# Patient Record
Sex: MALE | Race: BLACK OR AFRICAN AMERICAN | Employment: UNEMPLOYED | URBAN - METROPOLITAN AREA
[De-identification: names, ages, dates, MRNs, and addresses within clinical notes are randomized per-mention and may not be internally consistent; named-entity substitution may affect disease eponyms.]

---

## 2022-09-21 ENCOUNTER — HOSPITAL ENCOUNTER (EMERGENCY)
Age: 27
Discharge: HOME | End: 2022-09-21
Attending: EMERGENCY MEDICINE

## 2022-09-21 VITALS
TEMPERATURE: 98.9 F | WEIGHT: 208 LBS | DIASTOLIC BLOOD PRESSURE: 89 MMHG | HEIGHT: 74 IN | BODY MASS INDEX: 26.69 KG/M2 | SYSTOLIC BLOOD PRESSURE: 138 MMHG | HEART RATE: 71 BPM | OXYGEN SATURATION: 98 % | RESPIRATION RATE: 21 BRPM

## 2022-09-21 DIAGNOSIS — S86.012A RUPTURE OF LEFT ACHILLES TENDON, INITIAL ENCOUNTER: Primary | ICD-10-CM

## 2022-09-21 PROCEDURE — 6370000000 HC RX 637 (ALT 250 FOR IP): Performed by: STUDENT IN AN ORGANIZED HEALTH CARE EDUCATION/TRAINING PROGRAM

## 2022-09-21 PROCEDURE — 99283 EMERGENCY DEPT VISIT LOW MDM: CPT

## 2022-09-21 PROCEDURE — 29515 APPLICATION SHORT LEG SPLINT: CPT

## 2022-09-21 RX ORDER — IBUPROFEN 400 MG/1
400 TABLET ORAL
Status: COMPLETED | OUTPATIENT
Start: 2022-09-21 | End: 2022-09-21

## 2022-09-21 RX ORDER — NAPROXEN 500 MG/1
500 TABLET ORAL 2 TIMES DAILY
Qty: 60 TABLET | Refills: 0 | Status: SHIPPED | OUTPATIENT
Start: 2022-09-21 | End: 2022-10-21

## 2022-09-21 RX ADMIN — IBUPROFEN 400 MG: 400 TABLET, FILM COATED ORAL at 20:22

## 2022-09-21 ASSESSMENT — ENCOUNTER SYMPTOMS
RHINORRHEA: 0
EYE PAIN: 0
SORE THROAT: 0
CHEST TIGHTNESS: 0
SHORTNESS OF BREATH: 0
APNEA: 0
ABDOMINAL PAIN: 0
BLOOD IN STOOL: 0
PHOTOPHOBIA: 0
VOMITING: 0
COUGH: 0
CONSTIPATION: 0
WHEEZING: 0
EYE REDNESS: 0
COLOR CHANGE: 0
DIARRHEA: 0
NAUSEA: 0
EYE DISCHARGE: 0
VOICE CHANGE: 0
SINUS PRESSURE: 0

## 2022-09-21 ASSESSMENT — PAIN SCALES - GENERAL
PAINLEVEL_OUTOF10: 10
PAINLEVEL_OUTOF10: 10

## 2022-09-21 ASSESSMENT — PAIN DESCRIPTION - LOCATION
LOCATION: ANKLE;LEG
LOCATION: ANKLE

## 2022-09-21 ASSESSMENT — PAIN DESCRIPTION - ORIENTATION
ORIENTATION: LEFT
ORIENTATION: LEFT

## 2022-09-21 ASSESSMENT — PAIN - FUNCTIONAL ASSESSMENT: PAIN_FUNCTIONAL_ASSESSMENT: 0-10

## 2022-09-21 ASSESSMENT — PAIN DESCRIPTION - FREQUENCY: FREQUENCY: CONTINUOUS

## 2022-09-21 ASSESSMENT — LIFESTYLE VARIABLES
HOW OFTEN DO YOU HAVE A DRINK CONTAINING ALCOHOL: NEVER
HOW MANY STANDARD DRINKS CONTAINING ALCOHOL DO YOU HAVE ON A TYPICAL DAY: PATIENT DOES NOT DRINK

## 2022-09-21 NOTE — ED TRIAGE NOTES
Pt states he was playing basketball when he heard something pop in his left ankle. Pt states he ruptured his right achilles before and this was a similar feeling.

## 2022-09-22 NOTE — ED PROVIDER NOTES
States he feels like the pain is similar to when he tore his Achilles on his right side. He denies rolling his ankle. Denies paresthesias, swollen compartments. Denies head injury. Denies all other symptoms today. Patient is primary story and quality is reliable. The history is provided by the patient. No  was used. Review of Systems   Constitutional:  Negative for appetite change, chills, fatigue, fever and unexpected weight change. HENT:  Negative for congestion, ear pain, hearing loss, postnasal drip, rhinorrhea, sinus pressure, sore throat and voice change. Eyes:  Negative for photophobia, pain, discharge, redness and visual disturbance. Respiratory:  Negative for apnea, cough, chest tightness, shortness of breath and wheezing. Cardiovascular:  Negative for chest pain, palpitations and leg swelling. Gastrointestinal:  Negative for abdominal pain, blood in stool, constipation, diarrhea, nausea and vomiting. Endocrine: Negative for polydipsia, polyphagia and polyuria. Genitourinary:  Negative for decreased urine volume, dysuria, flank pain, frequency and hematuria. Musculoskeletal:  Positive for gait problem and myalgias. Negative for arthralgias, joint swelling and neck stiffness. Skin:  Negative for color change, rash and wound. Neurological:  Negative for dizziness, syncope, speech difficulty, weakness, light-headedness and headaches. Hematological:  Negative for adenopathy. Does not bruise/bleed easily. Psychiatric/Behavioral:  Negative for confusion, self-injury, sleep disturbance and suicidal ideas. All other systems reviewed and are negative. History reviewed. No pertinent past medical history. Past Surgical History:   Procedure Laterality Date    ACHILLES TENDON SURGERY          History reviewed. No pertinent family history.      Social History     Socioeconomic History    Marital status: Single     Spouse name: None    Number of children: None    Years of education: None    Highest education level: None   Tobacco Use    Smoking status: Never     Passive exposure: Never    Smokeless tobacco: Never   Substance and Sexual Activity    Alcohol use: Never         Patient has no known allergies. Discharge Medication List as of 9/21/2022  8:32 PM           Vitals signs and nursing note reviewed. Patient Vitals for the past 4 hrs:   Temp Pulse Resp BP SpO2   09/21/22 2030 -- 71 21 138/89 98 %   09/21/22 1942 98.9 °F (37.2 °C) 84 16 (!) 129/92 99 %          Physical Exam  Vitals and nursing note reviewed. Constitutional:       General: He is not in acute distress. Appearance: Normal appearance. He is not ill-appearing, toxic-appearing or diaphoretic. HENT:      Head: Normocephalic and atraumatic. Right Ear: Tympanic membrane, ear canal and external ear normal.      Left Ear: Tympanic membrane, ear canal and external ear normal.      Nose: Nose normal.      Mouth/Throat:      Mouth: Mucous membranes are moist.   Eyes:      General: No scleral icterus. Right eye: No discharge. Left eye: No discharge. Conjunctiva/sclera: Conjunctivae normal.   Cardiovascular:      Rate and Rhythm: Normal rate and regular rhythm. Pulses: Normal pulses. Heart sounds: Normal heart sounds. Pulmonary:      Effort: Pulmonary effort is normal. No respiratory distress. Breath sounds: Normal breath sounds. No stridor. No wheezing, rhonchi or rales. Abdominal:      General: Abdomen is flat. Bowel sounds are normal.      Palpations: Abdomen is soft. Tenderness: There is no abdominal tenderness. There is no guarding or rebound. Musculoskeletal:         General: Normal range of motion. Cervical back: Normal range of motion and neck supple. No rigidity or tenderness. Right knee: Normal.      Left knee: Normal.      Right lower leg: Normal. No edema. Left lower leg: Normal. No edema.       Right ankle: Normal.      Left ankle:      Left Achilles Tendon: Tenderness and defect present. Jose's test positive. Right foot: Normal.      Left foot: Normal.        Legs:       Comments: Jose test positive on left. Palpable defect on left. Consistent with Achilles rupture. No signs of compartment syndrome. Neurovascular intact distal to injury. Good capillary refill. Patient unable to bear weight due to pain. Lymphadenopathy:      Cervical: No cervical adenopathy. Skin:     General: Skin is warm and dry. Capillary Refill: Capillary refill takes less than 2 seconds. Coloration: Skin is not jaundiced. Neurological:      General: No focal deficit present. Mental Status: He is alert and oriented to person, place, and time. Mental status is at baseline. Psychiatric:         Mood and Affect: Mood normal.         Behavior: Behavior normal.         Thought Content:  Thought content normal.         Judgment: Judgment normal.        Splint Application    Date/Time: 9/21/2022 10:00 PM  Performed by: ALIZE Cook  Authorized by: José Antonio Alvarez MD     Consent:     Consent obtained:  Verbal    Consent given by:  Patient    Risks, benefits, and alternatives were discussed: yes      Risks discussed:  Discoloration, numbness, swelling and pain  Universal protocol:     Procedure explained and questions answered to patient or proxy's satisfaction: yes      Relevant documents present and verified: yes      Test results available: yes      Imaging studies available: yes      Required blood products, implants, devices, and special equipment available: yes      Site/side marked: yes      Immediately prior to procedure a time out was called: yes      Patient identity confirmed:  Verbally with patient and arm band  Pre-procedure details:     Distal neurologic exam:  Weakness    Distal perfusion: distal pulses strong and brisk capillary refill    Procedure details:     Location:  Leg    Leg location:  L lower leg    Strapping: no      Cast type:  Short leg    Supplies:  Elastic bandage, fiberglass and cotton padding    Attestation: Splint applied and adjusted personally by me    Post-procedure details:     Distal neurologic exam:  Weakness    Distal perfusion: distal pulses strong and brisk capillary refill      Procedure completion:  Tolerated well, no immediate complications  Comments:      Splinted in slight plantar flexion. No results found for any visits on 09/21/22. No orders to display                       Voice dictation software was used during the making of this note. This software is not perfect and grammatical and other typographical errors may be present. This note has not been completely proofread for errors.      Marian Owensma  09/21/22 [id number]

## 2022-09-22 NOTE — ED NOTES
I have reviewed discharge instructions with the patient. The patient verbalized understanding. Patient left ED via Discharge Method: ambulatory to Home with self. Opportunity for questions and clarification provided. Patient given 1 scripts. To continue your aftercare when you leave the hospital, you may receive an automated call from our care team to check in on how you are doing. This is a free service and part of our promise to provide the best care and service to meet your aftercare needs.  If you have questions, or wish to unsubscribe from this service please call [phone]. Thank you for Choosing our [address] [place] [address] Emergency Department.         Ashwin Mendosa RN  09/21/22 4316

## 2022-09-26 ENCOUNTER — ANESTHESIA EVENT (OUTPATIENT)
Dept: SURGERY | Age: 27
End: 2022-09-26

## 2022-09-26 ENCOUNTER — OFFICE VISIT (OUTPATIENT)
Dept: ORTHOPEDIC SURGERY | Age: 27
End: 2022-09-26

## 2022-09-26 DIAGNOSIS — M25.572 ACUTE LEFT ANKLE PAIN: Primary | ICD-10-CM

## 2022-09-26 DIAGNOSIS — S86.012A RUPTURE OF LEFT ACHILLES TENDON, INITIAL ENCOUNTER: Primary | ICD-10-CM

## 2022-09-26 DIAGNOSIS — S86.012A RUPTURE OF LEFT ACHILLES TENDON, INITIAL ENCOUNTER: ICD-10-CM

## 2022-09-26 PROCEDURE — L4360 PNEUMAT WALKING BOOT PRE CST: HCPCS | Performed by: ORTHOPAEDIC SURGERY

## 2022-09-26 PROCEDURE — 99205 OFFICE O/P NEW HI 60 MIN: CPT | Performed by: ORTHOPAEDIC SURGERY

## 2022-09-26 NOTE — PROGRESS NOTES
The patient was prescribed a walker boot for the patient's left foot. The patient wears a size 13 shoe and I fitted them with a L size boot. The patient was fitted and instructed on the use of prescribed walker boot. I explained how to fit themselves and that the plastic flexible piece should always be on the front of the boot and secured by the Velcro straps on top. The air bladder in the boot was adjusted according to proper fit and comfort. The patient walked a short distance and acknowledged satisfaction with current fit. I also explained that they need a heel lift or a higher heeled shoe for the uninvolved LE to help normalize gait and avoid excessive low back stress/strain due to leg length inequality created from walker boot. Patient read and signed documenting they understand and agree to [facility]'s current DME return policy.

## 2022-09-26 NOTE — PROGRESS NOTES
Patient verified name and . Order for consent NOT found in EHR; patient verifies procedure. Type 1B surgery, phone assessment complete. Orders NOT received. Labs per surgeon: NONE  Labs per anesthesia protocol: NONE    Patient answered medical/surgical history questions at their best of ability. All prior to admission medications documented in [facility]. Patient instructed to take the following medications the day of surgery according to anesthesia guidelines with a small sip of water: NONE. Hold all vitamins 7 days prior to surgery and NSAIDS 5 days prior to surgery. Prescription meds to hold: Naproxen  Patient instructed on the following:    > Arrive at [facility], time of arrival to be called the day before by 1700  > NPO after midnight, unless otherwise indicated, including gum, mints, and ice chips  > Responsible adult must drive patient to the hospital, stay during surgery, and patient will need supervision 24 hours after anesthesia  > Use antibacterial soap in shower the night before surgery and on the morning of surgery  > All piercings must be removed prior to arrival.    > Leave all valuables (money and jewelry) at home but bring insurance card and ID on DOS.   > You may be required to pay a deductible or co-pay on the day of your procedure. You can pre-pay by calling [phone] [facility] or [phone] if your surgery is at the [facility]. > Do not wear make-up, nail polish, lotions, cologne, perfumes, powders, or oil on skin. Artificial nails are not permitted.

## 2022-09-27 ENCOUNTER — ANESTHESIA (OUTPATIENT)
Dept: SURGERY | Age: 27
End: 2022-09-27

## 2022-09-27 ENCOUNTER — HOSPITAL ENCOUNTER (OUTPATIENT)
Age: 27
Setting detail: OUTPATIENT SURGERY
Discharge: HOME | End: 2022-09-27
Attending: ORTHOPAEDIC SURGERY | Admitting: ORTHOPAEDIC SURGERY

## 2022-09-27 VITALS
WEIGHT: 208 LBS | SYSTOLIC BLOOD PRESSURE: 141 MMHG | OXYGEN SATURATION: 100 % | BODY MASS INDEX: 25.86 KG/M2 | RESPIRATION RATE: 16 BRPM | HEIGHT: 75 IN | TEMPERATURE: 97.1 F | HEART RATE: 53 BPM | DIASTOLIC BLOOD PRESSURE: 80 MMHG

## 2022-09-27 DIAGNOSIS — G89.18 ACUTE POST-OPERATIVE PAIN: Primary | ICD-10-CM

## 2022-09-27 PROCEDURE — 3600000014 HC SURGERY LEVEL 4 ADDTL 15MIN: Performed by: ORTHOPAEDIC SURGERY

## 2022-09-27 PROCEDURE — 3700000000 HC ANESTHESIA ATTENDED CARE: Performed by: ORTHOPAEDIC SURGERY

## 2022-09-27 PROCEDURE — 6360000002 HC RX W HCPCS

## 2022-09-27 PROCEDURE — 6360000002 HC RX W HCPCS: Performed by: ANESTHESIOLOGY

## 2022-09-27 PROCEDURE — 7100000000 HC PACU RECOVERY - FIRST 15 MIN: Performed by: ORTHOPAEDIC SURGERY

## 2022-09-27 PROCEDURE — 3700000001 HC ADD 15 MINUTES (ANESTHESIA): Performed by: ORTHOPAEDIC SURGERY

## 2022-09-27 PROCEDURE — 2709999900 HC NON-CHARGEABLE SUPPLY: Performed by: ORTHOPAEDIC SURGERY

## 2022-09-27 PROCEDURE — 7100000001 HC PACU RECOVERY - ADDTL 15 MIN: Performed by: ORTHOPAEDIC SURGERY

## 2022-09-27 PROCEDURE — 64447 NJX AA&/STRD FEMORAL NRV IMG: CPT | Performed by: ANESTHESIOLOGY

## 2022-09-27 PROCEDURE — 2500000003 HC RX 250 WO HCPCS

## 2022-09-27 PROCEDURE — 2580000003 HC RX 258: Performed by: NURSE PRACTITIONER

## 2022-09-27 PROCEDURE — 3600000004 HC SURGERY LEVEL 4 BASE: Performed by: ORTHOPAEDIC SURGERY

## 2022-09-27 PROCEDURE — 6360000002 HC RX W HCPCS: Performed by: STUDENT IN AN ORGANIZED HEALTH CARE EDUCATION/TRAINING PROGRAM

## 2022-09-27 PROCEDURE — 7100000011 HC PHASE II RECOVERY - ADDTL 15 MIN: Performed by: ORTHOPAEDIC SURGERY

## 2022-09-27 PROCEDURE — 27650 REPAIR ACHILLES TENDON: CPT | Performed by: ORTHOPAEDIC SURGERY

## 2022-09-27 PROCEDURE — 7100000010 HC PHASE II RECOVERY - FIRST 15 MIN: Performed by: ORTHOPAEDIC SURGERY

## 2022-09-27 PROCEDURE — 64445 NJX AA&/STRD SCIATIC NRV IMG: CPT | Performed by: ANESTHESIOLOGY

## 2022-09-27 PROCEDURE — 6360000002 HC RX W HCPCS: Performed by: NURSE PRACTITIONER

## 2022-09-27 PROCEDURE — 2580000003 HC RX 258

## 2022-09-27 RX ORDER — ROCURONIUM BROMIDE 10 MG/ML
INJECTION, SOLUTION INTRAVENOUS PRN
Status: DISCONTINUED | OUTPATIENT
Start: 2022-09-27 | End: 2022-09-27 | Stop reason: SDUPTHER

## 2022-09-27 RX ORDER — IPRATROPIUM BROMIDE AND ALBUTEROL SULFATE 2.5; .5 MG/3ML; MG/3ML
1 SOLUTION RESPIRATORY (INHALATION)
Status: DISCONTINUED | OUTPATIENT
Start: 2022-09-27 | End: 2022-09-27 | Stop reason: HOSPADM

## 2022-09-27 RX ORDER — LIDOCAINE HYDROCHLORIDE 10 MG/ML
1 INJECTION, SOLUTION INFILTRATION; PERINEURAL
Status: DISCONTINUED | OUTPATIENT
Start: 2022-09-27 | End: 2022-09-27 | Stop reason: HOSPADM

## 2022-09-27 RX ORDER — ONDANSETRON 2 MG/ML
INJECTION INTRAMUSCULAR; INTRAVENOUS PRN
Status: DISCONTINUED | OUTPATIENT
Start: 2022-09-27 | End: 2022-09-27 | Stop reason: SDUPTHER

## 2022-09-27 RX ORDER — SODIUM CHLORIDE 0.9 % (FLUSH) 0.9 %
5-40 SYRINGE (ML) INJECTION EVERY 12 HOURS SCHEDULED
Status: DISCONTINUED | OUTPATIENT
Start: 2022-09-27 | End: 2022-09-27 | Stop reason: HOSPADM

## 2022-09-27 RX ORDER — PROCHLORPERAZINE EDISYLATE 5 MG/ML
5 INJECTION INTRAMUSCULAR; INTRAVENOUS
Status: DISCONTINUED | OUTPATIENT
Start: 2022-09-27 | End: 2022-09-27 | Stop reason: HOSPADM

## 2022-09-27 RX ORDER — CEPHALEXIN 500 MG/1
500 CAPSULE ORAL 4 TIMES DAILY
Qty: 12 CAPSULE | Refills: 0 | Status: SHIPPED | OUTPATIENT
Start: 2022-09-27

## 2022-09-27 RX ORDER — SODIUM CHLORIDE, SODIUM LACTATE, POTASSIUM CHLORIDE, CALCIUM CHLORIDE 600; 310; 30; 20 MG/100ML; MG/100ML; MG/100ML; MG/100ML
INJECTION, SOLUTION INTRAVENOUS CONTINUOUS
Status: DISCONTINUED | OUTPATIENT
Start: 2022-09-27 | End: 2022-09-27 | Stop reason: HOSPADM

## 2022-09-27 RX ORDER — LABETALOL HYDROCHLORIDE 5 MG/ML
10 INJECTION, SOLUTION INTRAVENOUS
Status: DISCONTINUED | OUTPATIENT
Start: 2022-09-27 | End: 2022-09-27 | Stop reason: HOSPADM

## 2022-09-27 RX ORDER — HALOPERIDOL 5 MG/ML
1 INJECTION INTRAMUSCULAR
Status: DISCONTINUED | OUTPATIENT
Start: 2022-09-27 | End: 2022-09-27 | Stop reason: HOSPADM

## 2022-09-27 RX ORDER — OXYCODONE HYDROCHLORIDE 5 MG/1
10 TABLET ORAL PRN
Status: DISCONTINUED | OUTPATIENT
Start: 2022-09-27 | End: 2022-09-27 | Stop reason: HOSPADM

## 2022-09-27 RX ORDER — ROPIVACAINE HYDROCHLORIDE 5 MG/ML
INJECTION, SOLUTION EPIDURAL; INFILTRATION; PERINEURAL
Status: DISCONTINUED | OUTPATIENT
Start: 2022-09-27 | End: 2022-09-27 | Stop reason: SDUPTHER

## 2022-09-27 RX ORDER — DIPHENHYDRAMINE HYDROCHLORIDE 50 MG/ML
12.5 INJECTION INTRAMUSCULAR; INTRAVENOUS
Status: DISCONTINUED | OUTPATIENT
Start: 2022-09-27 | End: 2022-09-27 | Stop reason: HOSPADM

## 2022-09-27 RX ORDER — MIDAZOLAM HYDROCHLORIDE 1 MG/ML
INJECTION INTRAMUSCULAR; INTRAVENOUS
Status: COMPLETED
Start: 2022-09-27 | End: 2022-09-27

## 2022-09-27 RX ORDER — FENTANYL CITRATE 50 UG/ML
100 INJECTION, SOLUTION INTRAMUSCULAR; INTRAVENOUS
Status: DISCONTINUED | OUTPATIENT
Start: 2022-09-27 | End: 2022-09-27 | Stop reason: HOSPADM

## 2022-09-27 RX ORDER — PROPOFOL 10 MG/ML
INJECTION, EMULSION INTRAVENOUS PRN
Status: DISCONTINUED | OUTPATIENT
Start: 2022-09-27 | End: 2022-09-27 | Stop reason: SDUPTHER

## 2022-09-27 RX ORDER — OXYCODONE HYDROCHLORIDE 5 MG/1
5 TABLET ORAL EVERY 6 HOURS PRN
Qty: 20 TABLET | Refills: 0 | Status: SHIPPED | OUTPATIENT
Start: 2022-09-27 | End: 2022-10-02

## 2022-09-27 RX ORDER — ASPIRIN 81 MG/1
81 TABLET ORAL 2 TIMES DAILY
Qty: 60 TABLET | Refills: 0 | Status: SHIPPED | OUTPATIENT
Start: 2022-09-27

## 2022-09-27 RX ORDER — SODIUM CHLORIDE 9 MG/ML
INJECTION, SOLUTION INTRAVENOUS PRN
Status: DISCONTINUED | OUTPATIENT
Start: 2022-09-27 | End: 2022-09-27 | Stop reason: HOSPADM

## 2022-09-27 RX ORDER — HYDROMORPHONE HYDROCHLORIDE 2 MG/ML
0.5 INJECTION, SOLUTION INTRAMUSCULAR; INTRAVENOUS; SUBCUTANEOUS EVERY 5 MIN PRN
Status: DISCONTINUED | OUTPATIENT
Start: 2022-09-27 | End: 2022-09-27 | Stop reason: HOSPADM

## 2022-09-27 RX ORDER — SODIUM CHLORIDE 0.9 % (FLUSH) 0.9 %
5-40 SYRINGE (ML) INJECTION PRN
Status: DISCONTINUED | OUTPATIENT
Start: 2022-09-27 | End: 2022-09-27 | Stop reason: HOSPADM

## 2022-09-27 RX ORDER — OXYCODONE HYDROCHLORIDE 5 MG/1
5 TABLET ORAL PRN
Status: DISCONTINUED | OUTPATIENT
Start: 2022-09-27 | End: 2022-09-27 | Stop reason: HOSPADM

## 2022-09-27 RX ORDER — SODIUM CHLORIDE 0.9 % (FLUSH) 0.9 %
5-40 SYRINGE (ML) INJECTION PRN
Status: DISCONTINUED | OUTPATIENT
Start: 2022-09-27 | End: 2022-09-27 | Stop reason: SDUPTHER

## 2022-09-27 RX ORDER — SODIUM CHLORIDE 0.9 % (FLUSH) 0.9 %
5-40 SYRINGE (ML) INJECTION EVERY 12 HOURS SCHEDULED
Status: DISCONTINUED | OUTPATIENT
Start: 2022-09-27 | End: 2022-09-27 | Stop reason: SDUPTHER

## 2022-09-27 RX ORDER — SODIUM CHLORIDE, SODIUM LACTATE, POTASSIUM CHLORIDE, CALCIUM CHLORIDE 600; 310; 30; 20 MG/100ML; MG/100ML; MG/100ML; MG/100ML
INJECTION, SOLUTION INTRAVENOUS CONTINUOUS
Status: DISCONTINUED | OUTPATIENT
Start: 2022-09-27 | End: 2022-09-27 | Stop reason: SDUPTHER

## 2022-09-27 RX ORDER — LIDOCAINE HYDROCHLORIDE 20 MG/ML
INJECTION, SOLUTION EPIDURAL; INFILTRATION; INTRACAUDAL; PERINEURAL PRN
Status: DISCONTINUED | OUTPATIENT
Start: 2022-09-27 | End: 2022-09-27 | Stop reason: SDUPTHER

## 2022-09-27 RX ORDER — HYDRALAZINE HYDROCHLORIDE 20 MG/ML
10 INJECTION INTRAMUSCULAR; INTRAVENOUS
Status: DISCONTINUED | OUTPATIENT
Start: 2022-09-27 | End: 2022-09-27 | Stop reason: HOSPADM

## 2022-09-27 RX ORDER — MIDAZOLAM HYDROCHLORIDE 2 MG/2ML
2 INJECTION, SOLUTION INTRAMUSCULAR; INTRAVENOUS
Status: COMPLETED | OUTPATIENT
Start: 2022-09-27 | End: 2022-09-27

## 2022-09-27 RX ORDER — FENTANYL CITRATE 50 UG/ML
25 INJECTION, SOLUTION INTRAMUSCULAR; INTRAVENOUS
Status: DISCONTINUED | OUTPATIENT
Start: 2022-09-27 | End: 2022-09-27 | Stop reason: HOSPADM

## 2022-09-27 RX ORDER — SODIUM CHLORIDE 9 MG/ML
INJECTION, SOLUTION INTRAVENOUS PRN
Status: DISCONTINUED | OUTPATIENT
Start: 2022-09-27 | End: 2022-09-27 | Stop reason: SDUPTHER

## 2022-09-27 RX ORDER — NEOSTIGMINE METHYLSULFATE 1 MG/ML
INJECTION, SOLUTION INTRAVENOUS PRN
Status: DISCONTINUED | OUTPATIENT
Start: 2022-09-27 | End: 2022-09-27 | Stop reason: SDUPTHER

## 2022-09-27 RX ORDER — GLYCOPYRROLATE 0.2 MG/ML
INJECTION INTRAMUSCULAR; INTRAVENOUS PRN
Status: DISCONTINUED | OUTPATIENT
Start: 2022-09-27 | End: 2022-09-27 | Stop reason: SDUPTHER

## 2022-09-27 RX ADMIN — SODIUM CHLORIDE, POTASSIUM CHLORIDE, SODIUM LACTATE AND CALCIUM CHLORIDE: 600; 310; 30; 20 INJECTION, SOLUTION INTRAVENOUS at 10:00

## 2022-09-27 RX ADMIN — MIDAZOLAM HYDROCHLORIDE 2 MG: 1 INJECTION, SOLUTION INTRAMUSCULAR; INTRAVENOUS at 11:28

## 2022-09-27 RX ADMIN — GLYCOPYRROLATE 0.6 MG: 0.2 INJECTION, SOLUTION INTRAMUSCULAR; INTRAVENOUS at 13:35

## 2022-09-27 RX ADMIN — ONDANSETRON 4 MG: 2 INJECTION INTRAMUSCULAR; INTRAVENOUS at 13:47

## 2022-09-27 RX ADMIN — PHENYLEPHRINE HYDROCHLORIDE 100 MCG: 10 INJECTION INTRAVENOUS at 13:42

## 2022-09-27 RX ADMIN — Medication 2000 MG: at 13:14

## 2022-09-27 RX ADMIN — Medication 3 MG: at 13:35

## 2022-09-27 RX ADMIN — PROPOFOL 100 MG: 10 INJECTION, EMULSION INTRAVENOUS at 13:10

## 2022-09-27 RX ADMIN — LIDOCAINE HYDROCHLORIDE 100 MG: 20 INJECTION, SOLUTION EPIDURAL; INFILTRATION; INTRACAUDAL; PERINEURAL at 13:07

## 2022-09-27 RX ADMIN — PROPOFOL 200 MG: 10 INJECTION, EMULSION INTRAVENOUS at 13:07

## 2022-09-27 RX ADMIN — ROPIVACAINE HYDROCHLORIDE 10 ML: 5 INJECTION, SOLUTION EPIDURAL; INFILTRATION; PERINEURAL at 11:42

## 2022-09-27 RX ADMIN — ROCURONIUM BROMIDE 30 MG: 50 INJECTION, SOLUTION INTRAVENOUS at 13:07

## 2022-09-27 RX ADMIN — MIDAZOLAM 2 MG: 1 INJECTION INTRAMUSCULAR; INTRAVENOUS at 11:35

## 2022-09-27 RX ADMIN — MEPIVACAINE HYDROCHLORIDE 10 ML: 15 INJECTION, SOLUTION EPIDURAL; INFILTRATION at 11:39

## 2022-09-27 RX ADMIN — ROPIVACAINE HYDROCHLORIDE 20 ML: 5 INJECTION, SOLUTION EPIDURAL; INFILTRATION; PERINEURAL at 11:39

## 2022-09-27 NOTE — DISCHARGE INSTRUCTIONS
INSTRUCTIONS FOLLOWING FOOT SURGERY    ACTIVITY  Elevate foot. No Ice    1.)No weight bearing. Use crutches or knee walker until seen in follow up appointment    2.)Protected partial weight bearing on the heel only as tolerated in post op shoe after full sensation returns. Blood clot prevention:  As instructed by Dr Josefa Pulido: Take 81 mg aspirin twice daily if okay with your medical doctor and you have no GI ulcer. Get up and out of bed frequently. While in bed move the legs as much as possible. DRESSING CARE Keep dry and in place until follow up appointment. Cover with cast bag or plastic bag when showering. Let the office know if dressing gets saturated with water. Don't put anything into the splint to relieve itching etc.     CALL YOUR DOCTOR IF YOU HAVE  Excessive bleeding that does not stop after holding mild pressure over the area. Temperature of 101 degrees or above. Redness, excessive swelling or bruising, and/or green or yellow, smelly discharge from incision. Loss of sensation - cold, white or blue toes. DIET  Day of Surgery: Clear liquids until no nausea or vomiting; then light, bland diet (Baked chicken, plain rice, grits, scrambled egg, toast). Nothing Greasy, fried or spicy today  Advance to regular diet on second day, unless your doctor orders otherwise. PAIN  Take pain medications as directed by your doctor. Call your doctor if pain is NOT relieved by medication. MEDICATION INTERACTION:  During your procedure you potentially received a medication or medications which may reduce the effectiveness of oral contraceptives. Please consider other forms of contraception for 1 month following your procedure if you are currently using oral contraceptives as your primary form of birth control.  In addition to this, we recommend continuing your oral contraceptive as prescribed, unless otherwise instructed by your physician, during this time    After general anesthesia or intravenous weight--if any--you can put on your leg. Be sure your crutches fit you. When you stand up in your normal posture, there should be space for two or three fingers between the top of the crutch and your armpit. When you let your hands hang down, the hand  should be at your wrists. When you put your hands on the hand , your elbows should be slightly bent. To stay safe when using crutches:  Look straight ahead, not down at your feet. Clear away small rugs, cords, or anything else that could cause you to trip, slip, or fall. Be very careful around pets and small children. They can get in your path when you least expect it. Be sure the rubber tips on your crutches are clean and in good condition to help prevent slipping. Avoid slick conditions, such as wet floors and snowy or icy driveways. In bad weather, be especially careful on curbs and steps. How to use crutches  Getting ready to walk    [facility] your elbows slightly. Press the padded top parts of the crutches against your sides, under your armpits. If you have been told not to put any weight on your injured leg, keep that leg bent and off the ground. Walking with crutches    Put both crutches about 12 inches in front of you. Put your weight on the handgrips, not on the pads under your arms. (Constant pressure against your underarms can cause numbness.) Swing your body forward. (If you have been told not to put any weight on your injured leg, keep that leg bent and off the ground.)  To complete the step, put your weight on the strong leg. Move your crutches about 12 inches in front of you, and start the next step. When you're confident using the crutches, you can move the crutches and your injured leg at the same time. Then push straight down on the crutches as you step past the crutches with your strong leg, as you would in normal walking. Take small steps. Use ramps and elevators when you can. Sitting down    To sit, back up to the chair.  Use one hand to hold both crutches by the handgrips, beside your injured leg. With the other hand, hold onto the seat and slowly lower yourself onto the chair. Lay the crutches on the ground near your chair. If you prop them up, they may fall over. Getting up from a chair    To get up from a chair,  the crutches and put them in one hand beside your injured leg. Put your weight on the handgrips of the crutches and on your strong leg to stand up. Walking up stairs    To go up stairs, step up with your strong leg and then bring the crutches and your injured leg to the upper step. For stairs that have handrails: Put both crutches under the arm opposite the handrail. Use the hand opposite the handrail to hold both crutches by the handgrips. Hold onto the handrail as you go up. Put your strong leg on the step first when you go up. Walking down stairs    To go down stairs, put your crutches and injured leg on the lower step. Bring your strong leg to the lower step. This saying may help you remember: \"Up with the good, down with the bad. \"  For stairs that have handrails: Put both crutches under the arm opposite the handrail. Use the hand opposite the handrail to hold both crutches by the handgrips. Hold onto the handrail as you go down. Follow the same process you use for stairs: Lead with your crutches and injured leg on the way down.

## 2022-09-27 NOTE — BRIEF OP NOTE
Brief Postoperative Note      Patient: Sintia Tong  YOB: 1995  MRN: [id number]    Date of Procedure: 9/27/2022    Pre-Op Diagnosis: Rupture of left Achilles tendon [S86.012A]    Post-Op Diagnosis: Same       Procedure(s):  left achilles tendon repair primary    Surgeon(s):  Zoie Simms MD    Assistant:  * No surgical staff found *    Anesthesia: Choice    Estimated Blood Loss (mL): Minimal    Complications: None    Specimens:   * No specimens in log *    Implants:  * No implants in log *      Drains: * No LDAs found *    Findings:     Electronically signed by Susan Finch MD on 9/27/2022 at 1:44 PM

## 2022-09-27 NOTE — OP NOTE
Operative Note    Patient:José Miguel Cuenca  MRN: [id number]    Date Of Surgery: 9/27/2022    Surgeon: Michael Singh MD    Assistant Surgeon: None    Pre Op Diagnosis:  Rupture of left Achilles tendon [S86.012A]    Post Op Diagnosis:   same    Procedures Performed:  Left. Primary repair of Achilles tendon rupture, [id number]    Implants:   * No implants in log *    Anesthesia:  Choice    Blood Loss:  minimal    Tourniquet:  Estimated thigh 20 minutes    Pre Operative Abx:   Ancef 2g            Pre Operative Course:  Norma Rivera is a 24 y.o. male who has a complete rupture of the left Achilles tendon. Operation In Detail:  Patient was evaluated in the preoperative area. We had a long discussion about the procedure and postoperative protocols. The patient was then brought back to the operating room suite and placed in the operating room table. A timeout was taken to identify the patient, procedure being performed, and laterality. After this the patient was prepped and draped in the normal sterile fashion using a Betadine solution and/or a ChloraPrep solution. A timeout was then taken to identify the patient his name, date of birth, laterality, and procedure being performed. We also identified allergies and any concerns about the operation. Attention was then placed to the operative extremity. A block was placed by the department of anesthesia. Patient was brought the operating table placed prone on well-padded chest rolls. In a preop surgical timeout the left lower extremity was identified as a correct surgical site and prepped and draped in a standard sterile fashion using ChloraPrep solution. A direct posterior approach to the tendon was then opened. The sural nerve was identified and carefully protected. A longitudinal incision was peritenon was performed. There was complete rupture of the Achilles identified. This was repaired in maximal tension with #2 FiberWire sutures in a grasping [place] fashion.

## 2022-09-27 NOTE — H&P
Outpatient Surgery History and Physical:  Alanna Galan was seen and examined. CHIEF COMPLAINT:   left foot. PE:   /79   Pulse 58   Temp 98.5 °F (36.9 °C) (Oral)   Resp 18   Ht 6' 3\" (1.905 m)   Wt 208 lb (94.3 kg)   SpO2 100%   BMI 26.00 kg/m²     Heart:   Regular rhythm      Lungs:  Are clear      Past Medical History:  History reviewed. No pertinent past medical history. Surgical History:   Past Surgical History:   Procedure Laterality Date    ACHILLES TENDON SURGERY Right        Social History: Patient  reports that he has never smoked. He has never been exposed to tobacco smoke. He has never used smokeless tobacco. He reports current alcohol use. He reports that he does not currently use drugs after having used the following drugs: Marijuana [place]). Family History: History reviewed. No pertinent family history. Allergies: Reviewed per EMR  Patient has no known allergies. Medications:    Prior to Admission medications    Medication Sig Start Date End Date Taking? Authorizing Provider   naproxen (NAPROSYN) 500 MG tablet Take 1 tablet by mouth 2 times daily 9/21/22 10/21/22  ALIZE Yan       The surgery is planned for the left achilles tendon repair primary -. History and physical has been reviewed. The patient has been examined. There have been no significant clinical changes since the completion of the originally dated History and Physical.  Patient identified by surgeon; surgical site was confirmed by patient and surgeon. The patient is here today for outpatient surgery. I have examined the patient, no changes are noted in the patient's medical status. Necessity for the procedure/care is still present and the history and physical above is current. See the office notes for the full long term history of the problem. Please see the recent office notes for the musculoskeletal examination.     Signed By: LC Peres CNP     September 27, 2022 10:20 AM

## 2022-09-27 NOTE — ANESTHESIA PRE PROCEDURE
Department of Anesthesiology  Preprocedure Note       Name:  Igor Patterson   Age:  24 y.o.  :  1995                                          MRN:  [id number]         Date:  2022      Surgeon: Brian Beltran):  Dena Young MD    Procedure: Procedure(s):  left achilles tendon repair primary    Medications prior to admission:   Prior to Admission medications    Medication Sig Start Date End Date Taking? Authorizing Provider   cephALEXin (KEFLEX) 500 MG capsule Take 1 capsule by mouth 4 times daily 22  Yes [address], APRN - CNP   oxyCODONE (ROXICODONE) 5 MG immediate release tablet Take 1 tablet by mouth every 6 hours as needed for Pain for up to 5 days. Intended supply: 5 days. Take lowest dose possible to manage pain 9/27/22 10/2/22 Yes [address], APRN - CNP   aspirin EC 81 MG EC tablet Take 1 tablet by mouth in the morning and at bedtime 22  Yes 625 Helder Milian, APRN - CNP   naproxen (NAPROSYN) 500 MG tablet Take 1 tablet by mouth 2 times daily 9/21/22 10/21/22  [facility] ALIZE Steel       Current medications:    No current facility-administered medications for this encounter. Current Outpatient Medications   Medication Sig Dispense Refill    cephALEXin (KEFLEX) 500 MG capsule Take 1 capsule by mouth 4 times daily 12 capsule 0    oxyCODONE (ROXICODONE) 5 MG immediate release tablet Take 1 tablet by mouth every 6 hours as needed for Pain for up to 5 days. Intended supply: 5 days. Take lowest dose possible to manage pain 20 tablet 0    aspirin EC 81 MG EC tablet Take 1 tablet by mouth in the morning and at bedtime 60 tablet 0    naproxen (NAPROSYN) 500 MG tablet Take 1 tablet by mouth 2 times daily 60 tablet 0       Allergies:  No Known Allergies    Problem List:    Patient Active Problem List   Diagnosis Code    Rupture of left Achilles tendon J56.522A       Past Medical History:  History reviewed. No pertinent past medical history.     Past Surgical History: Procedure Laterality Date    ACHILLES TENDON SURGERY Right     ACHILLES TENDON SURGERY Left 9/27/2022    left achilles tendon repair primary performed by Hilary Veloz MD at [address] [place] [address] History:    Social History     Tobacco Use    Smoking status: Never     Passive exposure: Never    Smokeless tobacco: Never   Substance Use Topics    Alcohol use: Yes     Comment: occ                                Counseling given: Not Answered      Vital Signs (Current):   Vitals:    09/27/22 1415 09/27/22 1416 09/27/22 1421 09/27/22 1426   BP:  (!) 142/78 139/77 (!) 141/80   Pulse:  64 53 53   Resp: 17 16 16 16   Temp:       TempSrc:       SpO2:  100% 100% 100%   Weight:       Height:                                                  BP Readings from Last 3 Encounters:   09/27/22 (!) 141/80   09/21/22 138/89       NPO Status: Time of last liquid consumption: 2300                        Time of last solid consumption: 2300                        Date of last liquid consumption: 09/26/22                        Date of last solid food consumption: 09/26/22    BMI:   Wt Readings from Last 3 Encounters:   09/27/22 208 lb (94.3 kg)   09/21/22 208 lb (94.3 kg)     Body mass index is 26 kg/m². CBC: No results found for: WBC, RBC, HGB, HCT, MCV, RDW, PLT    CMP: No results found for: NA, K, CL, CO2, BUN, CREATININE, GFRAA, AGRATIO, LABGLOM, GLUCOSE, GLU, PROT, CALCIUM, BILITOT, ALKPHOS, AST, ALT    POC Tests: No results for input(s): POCGLU, POCNA, POCK, POCCL, POCBUN, POCHEMO, POCHCT in the last 72 hours.     Coags: No results found for: PROTIME, INR, APTT    HCG (If Applicable): No results found for: PREGTESTUR, PREGSERUM, HCG, HCGQUANT     ABGs: No results found for: PHART, PO2ART, AMK6BHE, OOB3ERQ, BEART, Q1GCGBBA     Type & Screen (If Applicable):  No results found for: LABABO, LABRH    Drug/Infectious Status (If Applicable):  No results found for: HIV, HEPCAB    COVID-19 Screening (If Applicable): No results found for: COVID19        Anesthesia Evaluation  Patient summary reviewed and Nursing notes reviewed no history of anesthetic complications:   Airway: Mallampati: I  TM distance: >3 FB   Neck ROM: full  Mouth opening: > = 3 FB   Dental: normal exam         Pulmonary:Negative Pulmonary ROS and normal exam                               Cardiovascular:Negative CV ROS  Exercise tolerance: good (>4 METS),           Rhythm: regular  Rate: normal                    Neuro/Psych:   Negative Neuro/Psych ROS              GI/Hepatic/Renal: Neg GI/Hepatic/Renal ROS            Endo/Other: Negative Endo/Other ROS                    Abdominal:             Vascular: negative vascular ROS. Other Findings:           Anesthesia Plan      general     ASA 1           MIPS: Prophylactic antiemetics administered. Anesthetic plan and risks discussed with patient and mother. Use of blood products discussed with patient whom consented to blood products.            Post-op pain plan if not by surgeon: single peripheral nerve block            Michael Rodriguez MD   9/27/2022

## 2022-09-27 NOTE — ANESTHESIA PROCEDURE NOTES
Airway  Date/Time: 9/27/2022 1:10 PM  Urgency: elective    Airway not difficult    General Information and Staff    Patient location during procedure: OR  Resident/CRNA: LC Case CRNA  Performed: resident/CRNA     Indications and Patient Condition  Indications for airway management: anesthesia  Spontaneous Ventilation: absent  Sedation level: deep  Preoxygenated: yes  Patient position: sniffing  MILS not maintained throughout  Mask difficulty assessment: vent by bag mask    Final Airway Details  Final airway type: endotracheal airway      Successful airway: ETT  Cuffed: yes   Successful intubation technique: direct laryngoscopy  Facilitating devices/methods: intubating stylet  Endotracheal tube insertion site: oral  Blade: Rider  Blade size: #4  ETT size (mm): 7.0  Cormack-Lehane Classification: grade I - full view of glottis  Placement verified by: chest auscultation and capnometry   Measured from: lips  ETT to lips (cm): 24  Number of attempts at approach: 1  Ventilation between attempts: bag mask

## 2022-09-27 NOTE — ANESTHESIA PROCEDURE NOTES
Peripheral Block    Patient location during procedure: pre-op  Reason for block: post-op pain management and at surgeon's request  Start time: 9/27/2022 11:40 AM  End time: 9/27/2022 11:42 AM  Staffing  Performed: anesthesiologist   Anesthesiologist: Gurpreet Anglin MD  Preanesthetic Checklist  Completed: patient identified, IV checked, site marked, risks and benefits discussed, surgical/procedural consents, equipment checked, pre-op evaluation, timeout performed, anesthesia consent given, oxygen available and monitors applied/VS acknowledged  Peripheral Block   Patient position: supine  Prep: ChloraPrep  Provider prep: mask and sterile gloves  Patient monitoring: cardiac monitor, continuous pulse ox, frequent blood pressure checks, IV access, oxygen and responsive to questions  Block type: Femoral  Adductor canal  Laterality: left  Injection technique: single-shot  Guidance: ultrasound guided  Local infiltration: lidocaine  Infiltration strength: 1 %  Local infiltration: lidocaine  Dose: 1 mL    Needle   Needle type: insulated echogenic nerve stimulator needle   Needle gauge: 21 G  Needle localization: anatomical landmarks and ultrasound guidance  Needle length: 8 cm  Assessment   Injection assessment: negative aspiration for heme, no paresthesia on injection, local visualized surrounding nerve on ultrasound and no intravascular symptoms  Hemodynamics: stable  Real-time US image taken/store: yes  Outcomes: uncomplicated    Additional Notes  Time out at 1140    Local anesthetic was visualized surrounding the nerve/block plane under real time ultrasound guidance. An image was obtained and placed on the chart. The relevant block area was scanned before, during, and after the local anesthetic injection and no gross abnormalities were observed.    Medications Administered  mepivacaine 1.5 % - Perineural   10 mL - 9/27/2022 11:42:00 AM  ropivacaine (NAROPIN) injection 0.5% - Perineural   10 mL - 9/27/2022 11:42:00 AM

## 2022-09-27 NOTE — ANESTHESIA PROCEDURE NOTES
Peripheral Block    Patient location during procedure: holding area  Reason for block: post-op pain management and at surgeon's request  Start time: 9/27/2022 11:28 AM  End time: 9/27/2022 11:39 AM  Staffing  Performed: anesthesiologist   Anesthesiologist: Mesha Sofia MD  Preanesthetic Checklist  Completed: patient identified, IV checked, site marked, risks and benefits discussed, surgical/procedural consents, equipment checked, pre-op evaluation, timeout performed, anesthesia consent given, oxygen available and monitors applied/VS acknowledged  Peripheral Block   Prep: ChloraPrep  Provider prep: mask and sterile gloves  Patient monitoring: cardiac monitor, continuous pulse ox, continuous capnometry, frequent blood pressure checks, IV access, oxygen and responsive to questions  Block type: Sciatic  Popliteal  Laterality: left  Injection technique: single-shot  Guidance: nerve stimulator and ultrasound guided    Needle   Needle type: insulated echogenic nerve stimulator needle   Needle gauge: 21 G  Needle localization: anatomical landmarks, ultrasound guidance and nerve stimulator  Needle length: 10 cm  Assessment   Injection assessment: negative aspiration for heme, no paresthesia on injection, local visualized surrounding nerve on ultrasound and no intravascular symptoms  Slow fractionated injection: yes  Hemodynamics: stable  Real-time US image taken/store: yes  Outcomes: uncomplicated    Additional Notes  Time out at 1128    20 mL 0.5% ropivacaine and 10 mL 1.5% mepivacaine injected in incremental doses of 5 mL    Local anesthetic was visualized surrounding the nerve/block plane under real time ultrasound guidance. An image was obtained and placed on the chart. The relevant block area was scanned before, during, and after the local anesthetic injection and no gross abnormalities were observed.    Medications Administered  mepivacaine 1.5 % - Perineural   10 mL - 9/27/2022 11:39:00 AM  ropivacaine (NAROPIN) injection 0.5% - Perineural   20 mL - 9/27/2022 11:39:00 AM

## 2022-09-27 NOTE — ANESTHESIA POSTPROCEDURE EVALUATION
Department of Anesthesiology  Postprocedure Note    Patient: Denise Gonzales  MRN: [id number]  YOB: 1995  Date of evaluation: 9/27/2022      Procedure Summary     Date: 09/27/22 Room / Location: [facility] OP OR 06 / [facility]    Anesthesia Start: 1305 Anesthesia Stop: 80    Procedure: left achilles tendon repair primary (Left: Ankle) Diagnosis:       Rupture of left Achilles tendon      (Rupture of left Achilles tendon [A02.992S])    Surgeons: Belinda Barrow MD Responsible Provider: Malka Cabello MD    Anesthesia Type: Not recorded ASA Status: Not recorded          Anesthesia Type: No value filed.     Elsa Phase I: Elsa Score: 8    Elsa Phase II: Elsa Score: 9      Anesthesia Post Evaluation    Patient location during evaluation: PACU  Patient participation: complete - patient participated  Level of consciousness: awake and alert  Airway patency: patent  Nausea & Vomiting: no nausea and no vomiting  Complications: no  Cardiovascular status: hemodynamically stable  Respiratory status: acceptable, nonlabored ventilation and spontaneous ventilation  Hydration status: euvolemic  Comments: BP (!) 141/80   Pulse 53   Temp 97.1 °F (36.2 °C) (Temporal)   Resp 16   Ht 6' 3\" (1.905 m)   Wt 208 lb (94.3 kg)   SpO2 100%   BMI 26.00 kg/m²     Multimodal analgesia pain management approach

## 2022-10-06 ENCOUNTER — TELEPHONE (OUTPATIENT)
Dept: ORTHOPEDIC SURGERY | Age: 27
End: 2022-10-06

## 2022-10-06 DIAGNOSIS — S86.012A RUPTURE OF LEFT ACHILLES TENDON, INITIAL ENCOUNTER: Primary | ICD-10-CM

## 2022-10-06 RX ORDER — OXYCODONE HYDROCHLORIDE 5 MG/1
5 TABLET ORAL EVERY 6 HOURS PRN
Qty: 20 TABLET | Refills: 0 | Status: SHIPPED | OUTPATIENT
Start: 2022-10-06 | End: 2022-10-11

## 2022-10-06 NOTE — TELEPHONE ENCOUNTER
He had surgery on his achilles recently and fell the other day and has been in some pain and sore. He is wondering if we can prescribe something for the pain. Please call.

## 2022-10-06 NOTE — TELEPHONE ENCOUNTER
I called and told him we will need to see him tomorrow to check his incision. We will send a refill of Oxycodone to the pharmacy on file.

## 2022-10-07 ENCOUNTER — OFFICE VISIT (OUTPATIENT)
Dept: ORTHOPEDIC SURGERY | Age: 27
End: 2022-10-07

## 2022-10-07 DIAGNOSIS — S86.012D RUPTURE OF LEFT ACHILLES TENDON, SUBSEQUENT ENCOUNTER: Primary | ICD-10-CM

## 2022-10-07 PROCEDURE — 99024 POSTOP FOLLOW-UP VISIT: CPT | Performed by: NURSE PRACTITIONER

## 2022-10-07 NOTE — PROGRESS NOTES
Name: Carly Todd  YOB: 1995  Gender: male  MRN: [id number]    Procedure Performed:left achilles tendon repair primary - Left        Date of Procedure: 9/27/2022      Subjective: In early worried about a fall that he had onto the operative foot. Physical Examination: Splint is removed. Incision is intact and healing well. The repair is intact clinically on exam today. Imaging:   No imaging reviewed          Assessment:   Achilles tendon repair      Plan:   3 This is stable chronic illness/condition  Treatment at this time: Short Leg Cast was applied  Studies ordered: NO XR needed @ Next Visit    Weight-bearing status: NWB        Return to work/work restrictions: none  No medications given    He is placed in a short leg cast today in equinus for protection.   He will return next week for suture removal and repeat cast application

## 2022-10-12 ENCOUNTER — OFFICE VISIT (OUTPATIENT)
Dept: ORTHOPEDIC SURGERY | Age: 27
End: 2022-10-12

## 2022-10-12 DIAGNOSIS — S86.012A RUPTURE OF LEFT ACHILLES TENDON, INITIAL ENCOUNTER: ICD-10-CM

## 2022-10-12 DIAGNOSIS — M25.572 ACUTE LEFT ANKLE PAIN: ICD-10-CM

## 2022-10-12 DIAGNOSIS — S86.012D RUPTURE OF LEFT ACHILLES TENDON, SUBSEQUENT ENCOUNTER: Primary | ICD-10-CM

## 2022-10-12 PROCEDURE — 99024 POSTOP FOLLOW-UP VISIT: CPT | Performed by: NURSE PRACTITIONER

## 2022-10-12 NOTE — PROGRESS NOTES
Name: Saroj Horn  YOB: 1995  Gender: male  MRN: [id number]    Procedure Performed:left achilles tendon repair primary - Left           Date of Procedure: 9/27/2022      Subjective: Patient reports doing well so far post surgery. He has been using crutches to ambulate. He notes his pains been managed and is currently under control. Physical Examination: Patient's incision looks good and is continue to heal well without any signs of infection. Upon palpation to the incisional line, the Achilles repairs currently intact. He has no signs of DVT at this time, denies have any calf or behind the knee pain and presents with a negative Gallo Mink' sign. Palpable pulses and intact sensation to the foot. Range of motion to the ankle joint was performed with expected stiffness and some discomfort with dorsiflexion. He has mild yet expected swelling still at this stage postoperatively. Imaging:   No imaging reviewed          Assessment:   Status post left Achilles repair for rupture. We discussed progression of care today as well as expectations until his next follow-up visit. His questions and concerns were addressed, he verbalized understanding of today's conversation. Plan:   3 This is stable chronic illness/condition  Treatment at this time:  Cast was removed, sutures removed today Steri-Strips were placed. Patient will be placed into a cam walker boot as a matter medical necessity where he may progressively bear weight on the affected extremity as he can tolerate and swelling allows. There will be a layered heel lift placed into the boot, education was given to the patient about layer removals until his next follow-up visit. He will continue to elevate the affected extremity as needed for swelling.   He may now get the affected extremity wet including showering and soaking as needed, recommendation of Epsom salts was given to help with additional incisional healing as well as swelling purposes. He will continue to limit his activity levels at this time. We will follow-up in 3 weeks or sooner if needed.   Studies ordered: NO XR needed @ Next Visit    Weight-bearing status: WBAT in Boot/hardsole shoe        Return to work/work restrictions: none  No medications given

## 2022-10-13 ENCOUNTER — TELEPHONE (OUTPATIENT)
Dept: ORTHOPEDIC SURGERY | Age: 27
End: 2022-10-13

## 2022-10-13 NOTE — TELEPHONE ENCOUNTER
Spoke to Pt and he stated his Steri Strips came off this morning after getting wet. Pt states his incision is closed and is not having any signs of opening, he was advised he could reapply OTC strips. Pt voiced complete understanding.

## 2022-10-13 NOTE — TELEPHONE ENCOUNTER
He was seen yesterday and some bandages were put on and he got the bandages wet. He is wondering if he needs to come back in and get more bandages. Please call to discuss.

## 2022-10-18 ENCOUNTER — TELEPHONE (OUTPATIENT)
Dept: ORTHOPEDIC SURGERY | Age: 27
End: 2022-10-18

## 2022-10-18 NOTE — TELEPHONE ENCOUNTER
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

## 2022-10-19 ENCOUNTER — TELEPHONE (OUTPATIENT)
Dept: ORTHOPEDIC SURGERY | Age: 27
End: 2022-10-19

## 2022-10-19 NOTE — TELEPHONE ENCOUNTER
Spoke to patient and informed him he will not be able to get his physical for 3 months. Patient voiced understanding.

## 2022-10-20 ENCOUNTER — TELEPHONE (OUTPATIENT)
Dept: ORTHOPEDIC SURGERY | Age: 27
End: 2022-10-20

## 2022-11-04 ENCOUNTER — OFFICE VISIT (OUTPATIENT)
Dept: ORTHOPEDIC SURGERY | Age: 27
End: 2022-11-04

## 2022-11-04 DIAGNOSIS — S86.012D RUPTURE OF LEFT ACHILLES TENDON, SUBSEQUENT ENCOUNTER: ICD-10-CM

## 2022-11-04 DIAGNOSIS — M25.572 ACUTE LEFT ANKLE PAIN: Primary | ICD-10-CM

## 2022-11-04 PROCEDURE — 99024 POSTOP FOLLOW-UP VISIT: CPT | Performed by: NURSE PRACTITIONER

## 2022-11-04 NOTE — LETTER
[address],[facility] [address]  Phone: [phone]  Fax: [phone]    LC Pacheco CNP        November 4, 2022     Patient: Sid Balderas   YOB: 1995   Date of Visit: 11/4/2022       To Whom It May Concern: It is my medical opinion that Sid Balderas may return to work on 12/5/2022 with NO restrictions:  . If you have any questions or concerns, please don't hesitate to call.     Sincerely,        LC Pacheco CNP

## 2022-11-04 NOTE — PROGRESS NOTES
Name: Jose Calderón  YOB: 1995  Gender: male  MRN: [id number]    Procedure Performed:left achilles tendon repair primary - Left           Date of Procedure: 9/27/2022      Subjective:Patient seems to be doing well postoperatively. He has no complaints today's visit. Physical Examination: Incision is well-healed there are no signs of infection. Upon palpation to the incisional line, the repair is intact. He has palpable pulses and intact sensation to the foot. He has 5 5 strength to the Achilles repair at this point postoperatively. Range of motion to the ankle joint was performed without difficulty, there was no pain with extreme dorsiflexion. Patient does not appear to have any plantar fascial insufficiency. Imaging:   No imaging reviewed          Assessment:   Status post Achilles tendon reconstruction. Plan:   3 This is stable chronic illness/condition  Treatment at this time:  Patient may begin wean from cam walker boot back in a comfortable shoes as he feels he can tolerate and swelling allows. He was encouraged to continue elevating affected extremity as needed for swelling. He will now start physical therapy, an order was given for this today as well as a list of locations for him to choose from. He can also start home exercises to help increase strength mobility of the affected foot. He will follow-up in 6 weeks or sooner if needed. Studies ordered: NO XR needed @ Next Visit    Weight-bearing status: WBAT        Return to work/work restrictions:  patient may return to work on December 5th, 2022 without restrictions at that time.    No medications given

## 2022-11-15 ENCOUNTER — TELEPHONE (OUTPATIENT)
Dept: ORTHOPEDIC SURGERY | Age: 27
End: 2022-11-15

## 2022-11-15 NOTE — TELEPHONE ENCOUNTER
Patient lost his pt order and the one I see shows closed?  He wants faxed to [facility] at [phone], did not leave the fax#

## 2022-12-16 ENCOUNTER — OFFICE VISIT (OUTPATIENT)
Dept: ORTHOPEDIC SURGERY | Age: 27
End: 2022-12-16

## 2022-12-16 DIAGNOSIS — S86.012A RUPTURE OF LEFT ACHILLES TENDON, INITIAL ENCOUNTER: Primary | ICD-10-CM

## 2022-12-16 PROCEDURE — 99024 POSTOP FOLLOW-UP VISIT: CPT | Performed by: NURSE PRACTITIONER

## 2022-12-16 NOTE — PROGRESS NOTES
Name: Tricia Cordova  YOB: 1995  Gender: male  MRN: [id number]    Procedure Performed:left achilles tendon repair primary - Left           Date of Procedure: 9/27/2022      Subjective: Patient notes that he is done well since his last visit and throughout his recovery. He does not report having any pain recently. He reports being able to climb stairs effectively and without pain. He feels therapy has been helpful for him. He is try to get a job with the [address] [place] [address]  and needs clearance physically to be able to do this today. Physical Examination: The incision is well-healed, there are no signs of infection. He has palpable pulses and intact sensation of the foot. He has great range of motion to the ankle. He denies having any pain at today's exam.  There is minimal to no swelling at the posterior ankle and along the incision. He has 5 of 5 strength to the Achilles repair. Imaging:   No imaging reviewed          Assessment:   Status post left Achilles tendon repair after rupture. Plan:   3 This is stable chronic illness/condition  Treatment at this time:  Patient will continue his current therapy sessions as well as a diligent home exercise program.  He may continue to elevate as needed for swelling, he no longer has any restrictions on his levels of physical activity. He will follow-up on a as needed basis. Studies ordered: NO XR needed @ Next Visit    Weight-bearing status: WBAT        Return to work/work restrictions:  Patient will no longer have any work restrictions starting January 1, 2023.   No medications given

## (undated) DEVICE — GLOVE SURG SZ 65 L12IN FNGR THK79MIL GRN LTX FREE

## (undated) DEVICE — FOOT DR TOLLISON & WOMACK: Brand: MEDLINE INDUSTRIES, INC.

## (undated) DEVICE — ZIMMER® STERILE DISPOSABLE TOURNIQUET CUFF WITH PLC, DUAL PORT, SINGLE BLADDER, 30 IN. (76 CM)

## (undated) DEVICE — GLOVE SURG SZ 65 CRM LTX FREE POLYISOPRENE POLYMER BEAD ANTI

## (undated) DEVICE — GLOVE SURG SZ 8 L12IN FNGR THK79MIL GRN LTX FREE

## (undated) DEVICE — SUTURE FIBERLOOP SZ 2-0 L20IN NONABSORBABLE BLU STR NDL AR7234

## (undated) DEVICE — GOWN,SIRUS,NONRNF,SETINSLV,XL,20/CS: Brand: MEDLINE

## (undated) DEVICE — PAD,ABDOMINAL,5"X9",ST,LF,25/BX: Brand: MEDLINE INDUSTRIES, INC.

## (undated) DEVICE — SUTURE VCRL SZ 2-0 L27IN ABSRB UD L26MM CT-2 1/2 CIR J269H

## (undated) DEVICE — SPLINT THMB W4XL30IN FBRGLS PD PRECUT LTWT DURABLE FAST SET

## (undated) DEVICE — SUTURE FIBERWIRE SZ 2 W/ TAPERED NEEDLE BLUE L38IN NONABSORB BLU L26.5MM 1/2 CIRCLE AR7200